# Patient Record
Sex: FEMALE | Race: BLACK OR AFRICAN AMERICAN | NOT HISPANIC OR LATINO | URBAN - METROPOLITAN AREA
[De-identification: names, ages, dates, MRNs, and addresses within clinical notes are randomized per-mention and may not be internally consistent; named-entity substitution may affect disease eponyms.]

---

## 2024-11-14 ENCOUNTER — EMERGENCY (EMERGENCY)
Facility: HOSPITAL | Age: 44
LOS: 1 days | Discharge: HOME | End: 2024-11-14
Attending: EMERGENCY MEDICINE | Admitting: EMERGENCY MEDICINE
Payer: MEDICAID

## 2024-11-14 VITALS
OXYGEN SATURATION: 100 % | HEIGHT: 64 IN | RESPIRATION RATE: 16 BRPM | TEMPERATURE: 98 F | DIASTOLIC BLOOD PRESSURE: 86 MMHG | WEIGHT: 210.1 LBS | HEART RATE: 95 BPM | SYSTOLIC BLOOD PRESSURE: 134 MMHG

## 2024-11-14 VITALS
DIASTOLIC BLOOD PRESSURE: 70 MMHG | HEART RATE: 82 BPM | SYSTOLIC BLOOD PRESSURE: 101 MMHG | TEMPERATURE: 98 F | RESPIRATION RATE: 18 BRPM | OXYGEN SATURATION: 98 %

## 2024-11-14 LAB — HCG UR QL: NEGATIVE — SIGNIFICANT CHANGE UP

## 2024-11-14 PROCEDURE — 99284 EMERGENCY DEPT VISIT MOD MDM: CPT | Mod: 25

## 2024-11-14 PROCEDURE — 72110 X-RAY EXAM L-2 SPINE 4/>VWS: CPT | Mod: 26

## 2024-11-14 PROCEDURE — 99284 EMERGENCY DEPT VISIT MOD MDM: CPT

## 2024-11-14 PROCEDURE — 96372 THER/PROPH/DIAG INJ SC/IM: CPT

## 2024-11-14 PROCEDURE — 81025 URINE PREGNANCY TEST: CPT

## 2024-11-14 PROCEDURE — 72110 X-RAY EXAM L-2 SPINE 4/>VWS: CPT

## 2024-11-14 RX ORDER — METHOCARBAMOL 500 MG/1
2 TABLET ORAL
Qty: 30 | Refills: 0
Start: 2024-11-14 | End: 2024-11-18

## 2024-11-14 RX ORDER — METHOCARBAMOL 500 MG/1
1500 TABLET ORAL ONCE
Refills: 0 | Status: COMPLETED | OUTPATIENT
Start: 2024-11-14 | End: 2024-11-14

## 2024-11-14 RX ORDER — LIDOCAINE HYDROCHLORIDE 40 MG/ML
1 SOLUTION TOPICAL ONCE
Refills: 0 | Status: COMPLETED | OUTPATIENT
Start: 2024-11-14 | End: 2024-11-14

## 2024-11-14 RX ORDER — IBUPROFEN 200 MG
1 TABLET ORAL
Qty: 15 | Refills: 0
Start: 2024-11-14 | End: 2024-11-18

## 2024-11-14 RX ORDER — KETOROLAC TROMETHAMINE 30 MG/ML
30 INJECTION INTRAMUSCULAR; INTRAVENOUS ONCE
Refills: 0 | Status: DISCONTINUED | OUTPATIENT
Start: 2024-11-14 | End: 2024-11-14

## 2024-11-14 RX ADMIN — METHOCARBAMOL 1500 MILLIGRAM(S): 500 TABLET ORAL at 19:55

## 2024-11-14 RX ADMIN — KETOROLAC TROMETHAMINE 30 MILLIGRAM(S): 30 INJECTION INTRAMUSCULAR; INTRAVENOUS at 19:50

## 2024-11-14 RX ADMIN — LIDOCAINE HYDROCHLORIDE 1 PATCH: 40 SOLUTION TOPICAL at 19:50

## 2024-11-14 RX ADMIN — KETOROLAC TROMETHAMINE 30 MILLIGRAM(S): 30 INJECTION INTRAMUSCULAR; INTRAVENOUS at 20:45

## 2024-11-14 NOTE — ED PROVIDER NOTE - CARE PLAN
Dutasteride Counseling: Dustasteride Counseling:  I discussed with the patient the risks of use of dutasteride including but not limited to decreased libido, decreased ejaculate volume, and gynecomastia. Women who can become pregnant should not handle medication.  All of the patient's questions and concerns were addressed. Dutasteride Male Counseling: Dustasteride Counseling:  I discussed with the patient the risks of use of dutasteride including but not limited to decreased libido, decreased ejaculate volume, and gynecomastia. Women who can become pregnant should not handle medication.  All of the patient's questions and concerns were addressed. Principal Discharge DX:	Back pain   1

## 2024-11-14 NOTE — ED PROVIDER NOTE - DATE/TIME 1
Previous Labs: Yes
How Severe Is Your Hair Loss?: moderate
When Were The Labs Drawn? (Drawn...): 8/21
Lab Details: Thyroid panel ,antibodies,iron
14-Nov-2024 20:31

## 2024-11-14 NOTE — ED PROVIDER NOTE - CARE PROVIDER_API CALL
Mook Vaca [facility]  Spine Surgery  [address], [address]  [place] [address]  Phone: [phone]  Fax: [phone]  Follow Up Time: 4-6 Days

## 2024-11-14 NOTE — ED PROVIDER NOTE - CHPI ED SYMPTOMS NEG
no bladder dysfunction/no bowel dysfunction/no neck tenderness/no numbness/no tingling/no difficulty bearing weight/no motor function loss

## 2024-11-14 NOTE — ED ADULT NURSE NOTE - NS ED NURSE DISCH DISPOSITION
9.8    8.27  )-----------( 311      ( 21 Sep 2019 16:41 )             31.2           131<L>  |  102  |  58<H>  ----------------------------<  349<H>  6.5<HH>   |  11<L>  |  1.2    Ca    10.0      21 Sep 2019 16:41  Mg     2.2         TPro  7.0  /  Alb  3.8  /  TBili  0.4  /  DBili  x   /  AST  161<H>  /  ALT  146<H>  /  AlkPhos  222<H>                Urinalysis Basic - ( 21 Sep 2019 18:14 )    Color: Yellow / Appearance: Slightly Turbid / S.022 / pH: x  Gluc: x / Ketone: Negative  / Bili: Negative / Urobili: <2 mg/dL   Blood: x / Protein: 100 mg/dL / Nitrite: Negative   Leuk Esterase: Large / RBC: 14 /HPF / WBC 78 /HPF   Sq Epi: x / Non Sq Epi: 1 /HPF / Bacteria: Many        PT/INR - ( 21 Sep 2019 16:41 )   PT: 12.00 sec;   INR: 1.04 ratio         PTT - ( 21 Sep 2019 16:41 )  PTT:32.1 sec    Lactate Trend      CARDIAC MARKERS ( 21 Sep 2019 16:41 )  x     / 0.43 ng/mL / x     / x     / x            CAPILLARY BLOOD GLUCOSE            Culture Results:   >100,000 CFU/ml Escherichia coli  >100,000 CFU/ml Enterococcus faecalis ( @ 04:20)  Culture Results:   >100,000 CFU/ml Citrobacter koseri ( @ 07:42) Discharged

## 2024-11-14 NOTE — ED PROVIDER NOTE - OBJECTIVE STATEMENT
Patient is a 44-year-old female with no significant past medical history presents back pain today.  Patient reports right-sided back pain rating down her leg worse with movement.  Patient took Aleve and Tylenol which provided no relief.  Patient reports movement increases pain.  Patient denies fever chest pain abdominal pain nausea vomiting saddle anesthesia bowel or bladder incontinence

## 2024-11-14 NOTE — ED PROVIDER NOTE - PHYSICAL EXAMINATION
SPINE: c-spine: supple, no spinal tenderness. no midline tenderness. no paraspinal tenderness. no body step off. no deformity. no muscle spasm. FROM neg nexus   Thoracic spine: no spinal tenderness. no midline tenderness. no paraspinal tenderness. no body step off. no deformity. no muscle spasm.FROM   LS-spine:no spinal tenderness. no midline tenderness. right paraspinal tenderness. no body step off. no deformity.right sided  muscle spasm. FROM neg nexus from x 4. SENSATION GROSSLY INTACT X4. gait intact NO FOOT DROP

## 2024-11-14 NOTE — ED PROVIDER NOTE - CLINICAL SUMMARY MEDICAL DECISION MAKING FREE TEXT BOX
Attg note, Dr. Lee: 44y F c/o lower back pain, more on right side, radiates down right leg, started a couple of days ago, never had this before, did have neck issue previously, denies trauma, does not recall any lifting/pushing/pulling mechanism, hurts to move, mostly to try to get up, stand/walk, no numbness, no incontinence; on exam pt wd, wn, nad; back +ttp right lumbar paraspinal area, FROM, NVI, gait slow appears in discomfort; MDM xr l-spine, provide pain medication, will refer to ortho, discussed possible need for PT or MRI

## 2024-11-14 NOTE — ED ADULT NURSE NOTE - OBJECTIVE STATEMENT
Ambulatory to ER w/ c/o lower back pain radiating down right leg since this a.m. Denies injury or trauma. Taking Aleve w/out relief.

## 2024-11-14 NOTE — ED PROVIDER NOTE - PATIENT PORTAL LINK FT
You can access the FollowMyHealth Patient Portal offered by [facility] by registering at the following website: [email or web address]. By joining [facility]’s FollowMyHealth portal, you will also be able to view your health information using other applications (apps) compatible with our system.